# Patient Record
Sex: FEMALE | Race: ASIAN
[De-identification: names, ages, dates, MRNs, and addresses within clinical notes are randomized per-mention and may not be internally consistent; named-entity substitution may affect disease eponyms.]

---

## 2018-04-11 PROBLEM — Z00.00 ENCOUNTER FOR PREVENTIVE HEALTH EXAMINATION: Status: ACTIVE | Noted: 2018-04-11

## 2019-07-05 ENCOUNTER — APPOINTMENT (OUTPATIENT)
Dept: NEUROSURGERY | Facility: CLINIC | Age: 49
End: 2019-07-05

## 2019-08-16 ENCOUNTER — APPOINTMENT (OUTPATIENT)
Dept: NEUROSURGERY | Facility: CLINIC | Age: 49
End: 2019-08-16
Payer: MEDICAID

## 2019-08-16 VITALS — BODY MASS INDEX: 23.22 KG/M2 | HEIGHT: 64.17 IN | WEIGHT: 136 LBS

## 2019-08-16 DIAGNOSIS — I10 ESSENTIAL (PRIMARY) HYPERTENSION: ICD-10-CM

## 2019-08-16 DIAGNOSIS — Z86.39 PERSONAL HISTORY OF OTHER ENDOCRINE, NUTRITIONAL AND METABOLIC DISEASE: ICD-10-CM

## 2019-08-16 DIAGNOSIS — D49.2 NEOPLASM OF UNSPECIFIED BEHAVIOR OF BONE, SOFT TISSUE, AND SKIN: ICD-10-CM

## 2019-08-16 DIAGNOSIS — Z87.19 PERSONAL HISTORY OF OTHER DISEASES OF THE DIGESTIVE SYSTEM: ICD-10-CM

## 2019-08-16 PROCEDURE — 99214 OFFICE O/P EST MOD 30 MIN: CPT

## 2019-08-19 PROBLEM — D49.2 CERVICAL SPINE TUMOR: Status: ACTIVE | Noted: 2019-08-19

## 2019-08-19 PROBLEM — I10 BENIGN ESSENTIAL HYPERTENSION: Status: RESOLVED | Noted: 2019-08-19 | Resolved: 2019-08-19

## 2019-08-19 PROBLEM — Z86.39 HISTORY OF HIGH CHOLESTEROL: Status: RESOLVED | Noted: 2019-08-19 | Resolved: 2019-08-19

## 2019-08-19 PROBLEM — Z87.19 HISTORY OF GASTROESOPHAGEAL REFLUX (GERD): Status: RESOLVED | Noted: 2019-08-19 | Resolved: 2019-08-19

## 2019-08-19 NOTE — HISTORY OF PRESENT ILLNESS
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 She reports no neurological deficits today. \par \par On exam she is neurologically intact. There are no myelopathic signs. Motor / Sensory functions are intact. Gait is steady.

## 2019-08-19 NOTE — DATA REVIEWED
[de-identified] : \par - MRI of the cervical spine showed a stable C6 spinal cord tumor and stability of the widening of the spinal cord.

## 2019-08-19 NOTE — ASSESSMENT
[FreeTextEntry1] : Overall, Ms. Malik is doing well. She will undergo a repeat MRI of the cervical spine with/without gadolinium in 1 year. I will see her back once the study is completed in 1 year. She will call barring any issues.